# Patient Record
Sex: MALE | Race: WHITE | NOT HISPANIC OR LATINO
[De-identification: names, ages, dates, MRNs, and addresses within clinical notes are randomized per-mention and may not be internally consistent; named-entity substitution may affect disease eponyms.]

---

## 2018-04-06 ENCOUNTER — TRANSCRIPTION ENCOUNTER (OUTPATIENT)
Age: 73
End: 2018-04-06

## 2020-01-24 ENCOUNTER — APPOINTMENT (OUTPATIENT)
Dept: RHEUMATOLOGY | Facility: CLINIC | Age: 75
End: 2020-01-24
Payer: MEDICARE

## 2020-01-24 VITALS
OXYGEN SATURATION: 98 % | HEIGHT: 72 IN | BODY MASS INDEX: 26.41 KG/M2 | SYSTOLIC BLOOD PRESSURE: 144 MMHG | HEART RATE: 71 BPM | DIASTOLIC BLOOD PRESSURE: 81 MMHG | WEIGHT: 195 LBS

## 2020-01-24 DIAGNOSIS — M17.0 BILATERAL PRIMARY OSTEOARTHRITIS OF KNEE: ICD-10-CM

## 2020-01-24 DIAGNOSIS — Z86.79 PERSONAL HISTORY OF OTHER DISEASES OF THE CIRCULATORY SYSTEM: ICD-10-CM

## 2020-01-24 DIAGNOSIS — M25.50 PAIN IN UNSPECIFIED JOINT: ICD-10-CM

## 2020-01-24 DIAGNOSIS — Z86.59 PERSONAL HISTORY OF OTHER MENTAL AND BEHAVIORAL DISORDERS: ICD-10-CM

## 2020-01-24 DIAGNOSIS — Z82.61 FAMILY HISTORY OF ARTHRITIS: ICD-10-CM

## 2020-01-24 PROCEDURE — 99203 OFFICE O/P NEW LOW 30 MIN: CPT | Mod: 25

## 2020-01-24 PROCEDURE — 20610 DRAIN/INJ JOINT/BURSA W/O US: CPT | Mod: 50

## 2020-01-24 RX ORDER — IRBESARTAN 75 MG/1
75 TABLET ORAL
Qty: 30 | Refills: 0 | Status: ACTIVE | COMMUNITY
Start: 2019-09-25

## 2020-01-24 RX ORDER — ATORVASTATIN CALCIUM 10 MG/1
10 TABLET, FILM COATED ORAL
Qty: 90 | Refills: 0 | Status: ACTIVE | COMMUNITY
Start: 2019-09-25

## 2020-01-25 ENCOUNTER — MED ADMIN CHARGE (OUTPATIENT)
Age: 75
End: 2020-01-25

## 2020-01-25 PROBLEM — M25.50 POLYARTHRALGIA: Status: ACTIVE | Noted: 2020-01-25

## 2020-01-25 PROBLEM — M17.0 PRIMARY OSTEOARTHRITIS OF BOTH KNEES: Status: ACTIVE | Noted: 2020-01-25

## 2020-01-25 RX ORDER — METHYLPRED ACET/NACL,ISO-OS/PF 80 MG/ML
80 VIAL (ML) INJECTION
Qty: 1 | Refills: 0 | Status: COMPLETED | OUTPATIENT
Start: 2020-01-25

## 2020-01-25 RX ADMIN — METHYLPREDNISOLONE ACETATE MG/ML: 80 INJECTION, SUSPENSION INTRA-ARTICULAR; INTRALESIONAL; INTRAMUSCULAR; SOFT TISSUE at 00:00

## 2020-01-25 NOTE — PROCEDURE
[Patient] : the patient [Other Date:___] : Date: [unfilled] [Alternatives] : alternatives [Risks] : risks [Benefits] : benefits [Diagnostic] : diagnostic  [Consent Obtained] : written consent was obtained prior to the procedure and is detailed in the patient's record [Therapeutic] : therapeutic [#1 Site: ______] : #1 site identified in the [unfilled] [Ethyl Chloride] : ethyl chloride [___ ml Inj] : [unfilled] ~Uml [2%] : 2% [Betadine] : betadine solution [Alcohol] : alcohol [25 gauge 1.5 inch] : A 25 gauge 1.5 inch needle was used [Chlorhexidine] : chlorhexidine [Depomedrol ___ mg] : Depomedrol [unfilled] mg [No Complications] : there were no complications [Tolerated Well] : the patient tolerated the procedure well [Instructions Given] : handouts/patient instructions were given to patient [Patient Instructed to Call] : patient was instructed to call if redness at site, a decrease in range of motion or an increase in pain is noted after procedure.

## 2020-01-25 NOTE — PHYSICAL EXAM
[General Appearance - Alert] : alert [Sclera] : the sclera and conjunctiva were normal [General Appearance - Well Nourished] : well nourished [General Appearance - Well Developed] : well developed [Neck Appearance] : the appearance of the neck was normal [Respiration, Rhythm And Depth] : normal respiratory rhythm and effort [Oropharynx] : the oropharynx was normal [Auscultation Breath Sounds / Voice Sounds] : lungs were clear to auscultation bilaterally [Heart Sounds] : normal S1 and S2 [Full Pulse] : the pedal pulses are present [Cervical Lymph Nodes Enlarged Anterior Bilaterally] : anterior cervical [Edema] : there was no peripheral edema [Abdomen Tenderness] : non-tender [No Spinal Tenderness] : no spinal tenderness [Supraclavicular Lymph Nodes Enlarged Bilaterally] : supraclavicular [Musculoskeletal - Swelling] : no joint swelling seen [Abnormal Walk] : normal gait [Nail Clubbing] : no clubbing  or cyanosis of the fingernails [Motor Tone] : muscle strength and tone were normal [FreeTextEntry1] : Bony changes hands, knees with significant crepitus left knee less than right, limited range of motion bilateral knees [] : no rash [Oriented To Time, Place, And Person] : oriented to person, place, and time [Impaired Insight] : insight and judgment were intact [Motor Exam] : the motor exam was normal [Affect] : the affect was normal

## 2020-01-25 NOTE — CONSULT LETTER
[Dear  ___] : Dear  [unfilled], [Consult Letter:] : I had the pleasure of evaluating your patient, [unfilled]. [Please see my note below.] : Please see my note below. [Consult Closing:] : Thank you very much for allowing me to participate in the care of this patient.  If you have any questions, please do not hesitate to contact me. [Sincerely,] : Sincerely, [FreeTextEntry3] : Andreas Davis D.O\par

## 2020-01-25 NOTE — ASSESSMENT
[FreeTextEntry1] : 74-year-old  male with a history of hypercholesterolemia and depression presents for further evaluation of knee pain.\par \par He has underlying osteoarthritis in his hands and knees.I last saw him at least 3 years ago for a similar issue.\par \par Today, on examination he has crepitus and limited range of motion both knees, left knee is worse than the right. He is interested in local steroid injections.\par \par Plan-\par I aspirated and injected bilaterally knees with Depo-Medrol 80 mg x2\par -He tolerated the procedure well\par -To ice the area\par -To call if knee pain persists\par -He can definitely try supplements such as glucosamine and chondroitin, turmeric.\par -Continue Tylenol p.r.n. along with NSAIDs p.r.n.\par -He may be a surgical candidate as far as the left knee is concerned, we'll continue with clinical surveillance and follow for now\par -Followup p.r.n. to call when pain returns

## 2020-01-25 NOTE — HISTORY OF PRESENT ILLNESS
[FreeTextEntry1] : 74-year-old  male with a history of depression, hypercholesterolemia presents as a patient today. He comes in for further evaluation of knee pain. I had last seen him in 2016, at that time he received steroid injections with good results. The main pain is in his knees. He rates his pain as moderate to severe depending on that day and activity level. He has seen orthopedics in the past, he has been suggested the replacements but currently feels fine.\par \par He does use NSAIDs intermittently. He wants to he can get steroid injections today. He rates his current knee pain as 8/10.\par \par He denies psoriasis or colitis. He is an average appetite and denies weight loss. He denies fevers or chills or night sweats. He is otherwise up-to-date on his age-appropriate screenings.

## 2020-03-14 ENCOUNTER — TRANSCRIPTION ENCOUNTER (OUTPATIENT)
Age: 75
End: 2020-03-14

## 2023-04-26 ENCOUNTER — APPOINTMENT (OUTPATIENT)
Dept: GASTROENTEROLOGY | Facility: CLINIC | Age: 78
End: 2023-04-26
Payer: MEDICARE

## 2023-04-26 VITALS
BODY MASS INDEX: 25.6 KG/M2 | SYSTOLIC BLOOD PRESSURE: 140 MMHG | HEART RATE: 77 BPM | OXYGEN SATURATION: 98 % | HEIGHT: 72 IN | DIASTOLIC BLOOD PRESSURE: 70 MMHG | WEIGHT: 189 LBS

## 2023-04-26 DIAGNOSIS — K59.09 OTHER CONSTIPATION: ICD-10-CM

## 2023-04-26 DIAGNOSIS — Z12.11 ENCOUNTER FOR SCREENING FOR MALIGNANT NEOPLASM OF COLON: ICD-10-CM

## 2023-04-26 PROCEDURE — 99203 OFFICE O/P NEW LOW 30 MIN: CPT

## 2023-04-26 NOTE — ASSESSMENT
[FreeTextEntry1] : This is a pleasant 77-year-old man with history of chronic constipation presenting for colon cancer screening evaluation.  I explained to the patient and his wife Patricia the risks, alternatives and benefits to a colonoscopy.  Risk including but not limited to bleeding, perforation, infection and adverse medication reaction.  Questions were answered.  He stated understanding.  For the chronic constipation, I recommend supplementing with FiberCon on a daily basis.  He states when he does this he does have improvement in bowel movements.

## 2023-04-26 NOTE — PHYSICAL EXAM

## 2023-04-26 NOTE — HISTORY OF PRESENT ILLNESS
[FreeTextEntry1] : This is a 77-year-old man with history of hyperlipidemia and hypertension presenting for colon cancer screening evaluation.  He is accompanied by his wife.  His last colonoscopy was almost 10 years ago with benign colon polyp and diverticulosis.  He reports chronic constipation.  He has bowel movements every other day consisting of pushing and straining.  He had increased his fiber and water but still continues with pushing and straining.  When he takes FiberCon it does help.  He denies associated rectal bleeding or melena.  He denies weight loss or anemia.  He denies family history of colon cancer or colon polyps.

## 2023-04-26 NOTE — REASON FOR VISIT
[Initial Evaluation] : an initial evaluation [FreeTextEntry1] : chronic constipation, colon cancer screening

## 2023-06-23 RX ORDER — SODIUM PICOSULFATE, MAGNESIUM OXIDE, AND ANHYDROUS CITRIC ACID 10; 3.5; 12 MG/160ML; G/160ML; G/160ML
10-3.5-12 MG-GM LIQUID ORAL
Qty: 1 | Refills: 0 | Status: ACTIVE | COMMUNITY
Start: 2023-04-26 | End: 1900-01-01

## 2023-06-26 RX ORDER — POLYETHYLENE GLYCOL 3350 AND ELECTROLYTES WITH LEMON FLAVOR 236; 22.74; 6.74; 5.86; 2.97 G/4L; G/4L; G/4L; G/4L; G/4L
236 POWDER, FOR SOLUTION ORAL
Qty: 1 | Refills: 0 | Status: ACTIVE | COMMUNITY
Start: 2023-06-26 | End: 1900-01-01

## 2023-06-30 ENCOUNTER — APPOINTMENT (OUTPATIENT)
Dept: GASTROENTEROLOGY | Facility: AMBULATORY MEDICAL SERVICES | Age: 78
End: 2023-06-30
Payer: MEDICARE

## 2023-06-30 PROCEDURE — 45380 COLONOSCOPY AND BIOPSY: CPT | Mod: 59,PT

## 2023-06-30 PROCEDURE — 45385 COLONOSCOPY W/LESION REMOVAL: CPT | Mod: PT

## 2023-07-21 ENCOUNTER — NON-APPOINTMENT (OUTPATIENT)
Age: 78
End: 2023-07-21

## 2023-08-29 ENCOUNTER — APPOINTMENT (OUTPATIENT)
Age: 78
End: 2023-08-29
Payer: COMMERCIAL

## 2023-08-29 PROCEDURE — D9310: CPT

## 2023-08-29 PROCEDURE — D0367: CPT

## 2023-09-08 ENCOUNTER — APPOINTMENT (OUTPATIENT)
Age: 78
End: 2023-09-08
Payer: COMMERCIAL

## 2023-09-08 PROCEDURE — 99213 OFFICE O/P EST LOW 20 MIN: CPT
